# Patient Record
Sex: FEMALE | Race: BLACK OR AFRICAN AMERICAN | NOT HISPANIC OR LATINO | URBAN - METROPOLITAN AREA
[De-identification: names, ages, dates, MRNs, and addresses within clinical notes are randomized per-mention and may not be internally consistent; named-entity substitution may affect disease eponyms.]

---

## 2022-08-19 ENCOUNTER — WEB ENCOUNTER (OUTPATIENT)
Dept: URBAN - METROPOLITAN AREA CLINIC 84 | Facility: CLINIC | Age: 57
End: 2022-08-19

## 2022-08-25 ENCOUNTER — DASHBOARD ENCOUNTERS (OUTPATIENT)
Age: 57
End: 2022-08-25

## 2022-08-25 ENCOUNTER — OFFICE VISIT (OUTPATIENT)
Dept: URBAN - METROPOLITAN AREA CLINIC 84 | Facility: CLINIC | Age: 57
End: 2022-08-25
Payer: COMMERCIAL

## 2022-08-25 VITALS
SYSTOLIC BLOOD PRESSURE: 134 MMHG | WEIGHT: 195.6 LBS | DIASTOLIC BLOOD PRESSURE: 82 MMHG | HEIGHT: 68 IN | HEART RATE: 77 BPM | BODY MASS INDEX: 29.64 KG/M2 | TEMPERATURE: 97.8 F

## 2022-08-25 DIAGNOSIS — K29.70 HELICOBACTER POSITIVE GASTRITIS: ICD-10-CM

## 2022-08-25 DIAGNOSIS — Z85.3 HISTORY OF BREAST CANCER: ICD-10-CM

## 2022-08-25 DIAGNOSIS — Z12.11 SCREENING COLONOSCOPY: ICD-10-CM

## 2022-08-25 PROCEDURE — 99202 OFFICE O/P NEW SF 15 MIN: CPT | Performed by: INTERNAL MEDICINE

## 2022-08-25 RX ORDER — LETROZOLE 2.5 MG/1
1 TABLET TABLET, FILM COATED ORAL ONCE A DAY
Status: ACTIVE | COMMUNITY

## 2022-08-25 RX ORDER — POLYETHYLENE GLYCOL 3350, SODIUM CHLORIDE, SODIUM BICARBONATE, POTASSIUM CHLORIDE 420; 11.2; 5.72; 1.48 G/4L; G/4L; G/4L; G/4L
AS DIRECTED POWDER, FOR SOLUTION ORAL ONCE
Qty: 1 BOTTLE | Refills: 0 | OUTPATIENT
Start: 2022-08-25 | End: 2022-08-26

## 2022-08-25 RX ORDER — CEPHALEXIN 500 MG/1
CAPSULE ORAL
Qty: 0 | Refills: 0 | Status: ON HOLD | COMMUNITY
Start: 1900-01-01

## 2022-08-25 NOTE — HPI-TODAY'S VISIT:
The patient was referred by Dr. Helen Nazario and Dr. Berenice Connor for colonsocopy .   A copy of this document is being forwarded to the referring provider.  I last saw her in 2017 for EGD/Colon for TATIANA.  This was when she was newly diagnosed with breat cnacer and prior to her surgery.  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  Eradication was confirmed.  She ended up having lumpectomy with neoadjuvant chemotx and XRT post-op.  She is now on Letrazole.  Per the aptient she is in remission.  Per the patient she no longer has anemia.  Overall she feels well.  SHe denies anroexia or weight loss.  She denies abdominalk pain.  She deniesUGI symptoms.  SHe denies dysphagia.  She denies LGI symptoms.  She denies LGI Bleed or melena.  She has a goiter and follows with Endocrine.  Per the patient, recent US shows that it is actually shrinking.  She is due for repeat colonoscopy

## 2022-09-02 PROBLEM — 429087003: Status: ACTIVE | Noted: 2022-08-25

## 2022-09-02 PROBLEM — 196731005 GASTRODUODENITIS: Status: ACTIVE | Noted: 2022-08-25

## 2022-09-19 ENCOUNTER — WEB ENCOUNTER (OUTPATIENT)
Dept: URBAN - METROPOLITAN AREA SURGERY CENTER 20 | Facility: SURGERY CENTER | Age: 57
End: 2022-09-19

## 2022-09-19 ENCOUNTER — OFFICE VISIT (OUTPATIENT)
Dept: URBAN - METROPOLITAN AREA SURGERY CENTER 20 | Facility: SURGERY CENTER | Age: 57
End: 2022-09-19
Payer: COMMERCIAL

## 2022-09-19 DIAGNOSIS — Z12.11 COLON CANCER SCREENING: ICD-10-CM

## 2022-09-19 PROCEDURE — 45378 DIAGNOSTIC COLONOSCOPY: CPT | Performed by: INTERNAL MEDICINE

## 2022-09-19 PROCEDURE — G8907 PT DOC NO EVENTS ON DISCHARG: HCPCS | Performed by: INTERNAL MEDICINE

## 2022-09-19 RX ORDER — CEPHALEXIN 500 MG/1
CAPSULE ORAL
Qty: 0 | Refills: 0 | Status: ON HOLD | COMMUNITY
Start: 1900-01-01

## 2022-09-19 RX ORDER — LETROZOLE 2.5 MG/1
1 TABLET TABLET, FILM COATED ORAL ONCE A DAY
Status: ACTIVE | COMMUNITY